# Patient Record
Sex: MALE | Race: WHITE | NOT HISPANIC OR LATINO | URBAN - METROPOLITAN AREA
[De-identification: names, ages, dates, MRNs, and addresses within clinical notes are randomized per-mention and may not be internally consistent; named-entity substitution may affect disease eponyms.]

---

## 2021-08-18 ENCOUNTER — HOSPITAL ENCOUNTER (OUTPATIENT)
Facility: CLINIC | Age: 31
Discharge: HOME | End: 2021-08-18
Attending: INTERNAL MEDICINE
Payer: COMMERCIAL

## 2021-08-18 VITALS
OXYGEN SATURATION: 98 % | DIASTOLIC BLOOD PRESSURE: 60 MMHG | SYSTOLIC BLOOD PRESSURE: 130 MMHG | TEMPERATURE: 98 F | HEART RATE: 82 BPM

## 2021-08-18 DIAGNOSIS — L03.311 CELLULITIS, ABDOMINAL WALL: Primary | ICD-10-CM

## 2021-08-18 PROCEDURE — 87070 CULTURE OTHR SPECIMN AEROBIC: CPT | Performed by: INTERNAL MEDICINE

## 2021-08-18 PROCEDURE — 99203 OFFICE O/P NEW LOW 30 MIN: CPT | Performed by: INTERNAL MEDICINE

## 2021-08-18 PROCEDURE — S9083 URGENT CARE CENTER GLOBAL: HCPCS | Performed by: INTERNAL MEDICINE

## 2021-08-18 PROCEDURE — 87077 CULTURE AEROBIC IDENTIFY: CPT | Mod: 59 | Performed by: INTERNAL MEDICINE

## 2021-08-18 RX ORDER — CEPHALEXIN 500 MG/1
500 CAPSULE ORAL 3 TIMES DAILY
Qty: 24 CAPSULE | Refills: 0 | Status: SHIPPED | OUTPATIENT
Start: 2021-08-18 | End: 2021-08-26

## 2021-08-18 ASSESSMENT — ENCOUNTER SYMPTOMS
HEADACHES: 0
BACK PAIN: 0
SHORTNESS OF BREATH: 0
FEVER: 0
VOMITING: 0
NAUSEA: 0

## 2021-08-18 NOTE — DISCHARGE INSTRUCTIONS
In the office a wound culture was obtained from inside the umbilicus.  Examination is consistent with an infection given the redness and tenderness.  I am starting you back on antibiotics.  Please take as directed.  These were sent electronically to [facility].    Motrin or Advil can be used for pain as needed and directed.  Warm compresses in 15 to 20-minute intervals several times a day may be helpful as well.    I called and spoke with the surgery office at [facility].  We have you set up to see Dr. Gordillo tomorrow afternoon at 2 PM.  Please have a surgical mask, your ID and insurance card.  They are located in [place] [id number], [id number]  They will have access to your epic chart that was done here in the office today.

## 2021-08-18 NOTE — ED PROVIDER NOTES
History  Chief Complaint   Patient presents with   • Belly Button Discharge     Pus and bloody drainage from umbilicus  Happened a month ago; treated with keflex  Also cleaned with peroxide    Had abdominal US and recommended a follow-up  (limited due to gas)    NKDA      History provided by:  Patient   used: No    Rash  Quality: painful and weeping    Onset quality:  Sudden  Duration:  2 days  Chronicity:  New  Worsened by:  Contact  Associated symptoms: no fever, no headaches, no nausea, no shortness of breath and not vomiting    Associated symptoms comment:  Chronic HA's      No past medical history on file.    No past surgical history on file.    No family history on file.    Social History     Tobacco Use   • Smoking status: Not on file   Substance Use Topics   • Alcohol use: Not on file   • Drug use: Not on file       Review of Systems   Constitutional: Negative for fever.   Respiratory: Negative for shortness of breath.    Gastrointestinal: Negative for nausea and vomiting.   Musculoskeletal: Negative for back pain.   Skin: Positive for rash.   Neurological: Negative for headaches.       Physical Exam  ED Triage Vitals [08/18/21 1437]   Temp Heart Rate Resp BP SpO2   36.7 °C (98 °F) 82 -- 130/60 98 %      Temp Source Heart Rate Source Patient Position BP Location FiO2 (%) (Set)   Oral -- -- Left upper arm --       Physical Exam  Vitals reviewed.   Constitutional:       General: He is not in acute distress.     Appearance: He is not ill-appearing, toxic-appearing or diaphoretic.   HENT:      Mouth/Throat:      Mouth: Mucous membranes are moist.      Pharynx: No oropharyngeal exudate or posterior oropharyngeal erythema.   Cardiovascular:      Rate and Rhythm: Normal rate and regular rhythm.      Heart sounds: No murmur heard.     Pulmonary:      Effort: Pulmonary effort is normal. No respiratory distress.      Breath sounds: No wheezing or rales.   Abdominal:      General: There is no  distension.      Palpations: Abdomen is soft.      Hernia: No hernia is present.   Musculoskeletal:      Comments: There is erythema circumferentially around the umbilicus with some tenderness but no fluctuance.  There is dried crusted drainage over the umbilicus with tenderness associated around and inside the umbilicus.  No fluctuance appreciated.  Does not feel indurated.   Skin:     General: Skin is warm and dry.      Findings: Erythema present.   Neurological:      General: No focal deficit present.      Mental Status: He is alert.   Psychiatric:         Mood and Affect: Mood normal.         Behavior: Behavior normal.           Procedures  Procedures    UC Course  Clinical Impressions as of Aug 18 1455   Cellulitis, abdominal wall       MDM  Number of Diagnoses or Management Options  Cellulitis, abdominal wall  Diagnosis management comments: No real active drainage during my examination.  I tried to express some pustular material.  Did do a swab inside the umbilicus.  Restarting Keflex which seemed to resolve the problem a month ago.  I am going to have him follow-up with surgery tomorrow at [place].  I have confirmed a 2 PM appointment with Dr. Gordillo.  Please see disposition for full care plan.                 Esa Hernandez,   08/18/21 9134

## 2021-08-19 ENCOUNTER — OFFICE VISIT (OUTPATIENT)
Dept: SURGERY | Facility: CLINIC | Age: 31
End: 2021-08-19
Payer: COMMERCIAL

## 2021-08-19 VITALS — WEIGHT: 290 LBS | HEIGHT: 77 IN | BODY MASS INDEX: 34.24 KG/M2

## 2021-08-19 DIAGNOSIS — L02.216 ABSCESS, UMBILICAL: Primary | ICD-10-CM

## 2021-08-19 PROCEDURE — 3008F BODY MASS INDEX DOCD: CPT | Performed by: SURGERY

## 2021-08-19 PROCEDURE — 99204 OFFICE O/P NEW MOD 45 MIN: CPT | Performed by: SURGERY

## 2021-08-20 LAB
GRAM STN SPEC: ABNORMAL
GRAM STN SPEC: ABNORMAL
MICROORGANISM SPEC CULT: ABNORMAL

## 2021-08-23 ASSESSMENT — ENCOUNTER SYMPTOMS
COLOR CHANGE: 0
PHOTOPHOBIA: 1
PSYCHIATRIC NEGATIVE: 1
COUGH: 0
DIARRHEA: 0
ABDOMINAL PAIN: 1
CHILLS: 0
WOUND: 1
MUSCULOSKELETAL NEGATIVE: 1
BLOOD IN STOOL: 0
ENDOCRINE NEGATIVE: 1
SORE THROAT: 0
APPETITE CHANGE: 0
ACTIVITY CHANGE: 0
NEUROLOGICAL NEGATIVE: 1
CONSTIPATION: 0
HEMATOLOGIC/LYMPHATIC NEGATIVE: 1
ALLERGIC/IMMUNOLOGIC NEGATIVE: 1
FEVER: 0
ABDOMINAL DISTENTION: 0
SHORTNESS OF BREATH: 1

## 2021-08-23 NOTE — PROGRESS NOTES
[facility]    Dr. Marley Gordillo   General Surgery Consult       Patient: Esa Luna  1990    Visit Date: 8/19/2021  Encounter Provider: Marley Gordillo  Referring Provider: Enrrique Campos DO    Reason for Visit:  Consult (Umbilical Drainage)      Esa Luna is a 31 y.o. male who was seen for a consultation at the request of referring physician for recurrent umbilical drainage. The patient had a previous episode of umbilical drainage last month. Resolved with antibiotics. The patient now presents with recurrent drainage and pain from his umbilicus for the past few days. States it is blood stained drainage. He is also cleaning his umbilicus with hydrogen peroxide multiple times a day and probing the wound. He states his abdominal wall feels hot and he has pain when touching his umbilicus.  Denies fever, chills. No changes in bowel habits. No bulging of the umbilicus. I reviewed the previous PCP notes from [facility].   History reviewed. No pertinent past medical history.    History reviewed. No pertinent surgical history.    Social History     Tobacco Use   • Smoking status: Never Smoker   • Smokeless tobacco: Current User   Vaping Use   • Vaping Use: Every day   • Substances: Nicotine   • Devices: Disposable   Substance Use Topics   • Alcohol use: Not Currently   • Drug use: Never       Family History   Problem Relation Age of Onset   • Liver cancer Biological Father        Patient has no known allergies.    Current Medications:  Current Outpatient Medications   Medication Instructions   • cephalexin (KEFLEX) 500 mg, oral, 3 times daily       Review of Systems  Review of Systems   Constitutional: Negative for activity change, appetite change, chills and fever.   HENT: Negative for sneezing, sore throat and tinnitus.    Eyes: Positive for photophobia. Negative for visual disturbance.   Respiratory: Positive for shortness of breath. Negative for cough.    Cardiovascular: Negative  for chest pain and leg swelling.   Gastrointestinal: Positive for abdominal pain. Negative for abdominal distention, blood in stool, constipation and diarrhea.   Endocrine: Negative.    Genitourinary: Negative.    Musculoskeletal: Negative.    Skin: Positive for wound. Negative for color change.   Allergic/Immunologic: Negative.    Neurological: Negative.    Hematological: Negative.    Psychiatric/Behavioral: Negative.        Physical Exam  Physical Exam  Constitutional:       Appearance: Normal appearance. He is obese.   HENT:      Head: Normocephalic and atraumatic.      Right Ear: External ear normal.      Left Ear: External ear normal.      Mouth/Throat:      Mouth: Mucous membranes are moist.   Eyes:      General: No scleral icterus.     Conjunctiva/sclera: Conjunctivae normal.   Cardiovascular:      Rate and Rhythm: Normal rate and regular rhythm.      Pulses: Normal pulses.      Heart sounds: Normal heart sounds.   Pulmonary:      Effort: Pulmonary effort is normal.      Breath sounds: Normal breath sounds.   Abdominal:      General: There is no distension.      Palpations: Abdomen is soft. There is no mass.      Hernia: No hernia is present.      Comments: Umbilical base with small area of erythema, no hernia, small amount of serosanguinous drainage, no purulent drainage , crusty discharge removed from the base.   No periumbilical erythema, no skin induration.    Musculoskeletal:      Cervical back: Normal range of motion and neck supple.   Neurological:      Mental Status: He is alert.           Assessment      Diagnosis Plan   1. Abscess, umbilical  CT ABDOMEN PELVIS WITH IV CONTRAST    BUN    Creatinine, serum             Plan:   CT of the abdomen and pelvis to evaluate for hernia vs. Urachal cyst vs abdominal wall abscess  Finish PO antibiotics  Discontinue hydrogen peroxide use, patient instructed to clean with mild soap and water or saline and keep umbilicus dry   Follow up after CT       Marley Gordillo  MD

## 2021-08-24 ENCOUNTER — TELEPHONE (OUTPATIENT)
Dept: SURGERY | Facility: CLINIC | Age: 31
End: 2021-08-24

## 2021-08-25 ENCOUNTER — APPOINTMENT (OUTPATIENT)
Dept: LAB | Age: 31
End: 2021-08-25
Attending: SURGERY
Payer: COMMERCIAL

## 2021-08-25 DIAGNOSIS — L02.216 CUTANEOUS ABSCESS OF UMBILICUS: ICD-10-CM

## 2021-08-25 PROCEDURE — 30099997 SPECIMEN PROCESSING

## 2021-08-27 ENCOUNTER — HOSPITAL ENCOUNTER (OUTPATIENT)
Dept: RADIOLOGY | Facility: HOSPITAL | Age: 31
Discharge: HOME | End: 2021-08-27
Attending: SURGERY
Payer: COMMERCIAL

## 2021-08-27 DIAGNOSIS — L02.216 ABSCESS, UMBILICAL: ICD-10-CM

## 2021-08-27 LAB
BUN SERPL-MCNC: 18 MG/DL (ref 7–25)
CREAT SERPL-MCNC: 0.88 MG/DL (ref 0.6–1.35)
QUEST EGFR AFRICAN AMERICAN: 134 ML/MIN/1.73M2
QUEST EGFR NON-AFR. AMERICAN: 115 ML/MIN/1.73M2

## 2021-08-27 PROCEDURE — G1004 CDSM NDSC: HCPCS

## 2021-08-27 PROCEDURE — 63600105 HC IODINE BASED CONTRAST: Performed by: SURGERY

## 2021-08-27 RX ADMIN — IOHEXOL 112 ML: 300 INJECTION, SOLUTION INTRAVENOUS at 15:36

## 2021-08-31 ENCOUNTER — OFFICE VISIT (OUTPATIENT)
Dept: SURGERY | Facility: CLINIC | Age: 31
End: 2021-08-31
Payer: COMMERCIAL

## 2021-08-31 VITALS — BODY MASS INDEX: 34.24 KG/M2 | HEIGHT: 77 IN | WEIGHT: 290 LBS

## 2021-08-31 DIAGNOSIS — Q64.4 URACHAL REMNANT: Primary | ICD-10-CM

## 2021-08-31 PROCEDURE — 3008F BODY MASS INDEX DOCD: CPT | Performed by: SURGERY

## 2021-08-31 PROCEDURE — 99213 OFFICE O/P EST LOW 20 MIN: CPT | Performed by: SURGERY

## 2021-08-31 NOTE — PROGRESS NOTES
[facility]    Dr. Marley Gordillo   Office Visit       Patient: Esa Luna  1990    Visit Date: 08/31/2021  Encounter Provider: Marley Gordillo  Referring Provider: Enrrique Campos DO    Reason for Visit:  Follow-up      Esa Luna is a 31 y.o. male who was seen for a follow up of umbilical wound drainage and CT. Wound drainage resolved. Finished antibiotics. No periumbilical pain. Some pain in LUQ after CT, resolving.  No fever, no chills.   History reviewed. No pertinent past medical history.    History reviewed. No pertinent surgical history.    Social History     Social History Narrative   • Not on file       Family History   Problem Relation Age of Onset   • Liver cancer Biological Father        Patient has no known allergies.    Current Medications:  Current Outpatient Medications   Medication Instructions   • naproxen sodium (ALEVE ORAL) oral, As needed       Review of Systems  Review of Systems   Constitutional: Negative for chills and fever.   HENT: Negative.    Eyes: Negative.    Respiratory: Negative.    Cardiovascular: Negative.    Gastrointestinal: Positive for abdominal pain. Negative for abdominal distention, diarrhea and nausea.   Endocrine: Negative.    Genitourinary: Negative.  Negative for dysuria, flank pain and hematuria.   Musculoskeletal: Negative.    Skin: Negative for color change and wound.   Allergic/Immunologic: Negative.    Neurological: Negative.    Hematological: Negative.    Psychiatric/Behavioral: Negative.        Physical Exam  Physical Exam  Constitutional:       Appearance: Normal appearance. He is obese.   HENT:      Head: Normocephalic and atraumatic.      Nose: Nose normal.      Mouth/Throat:      Mouth: Mucous membranes are moist.   Eyes:      General: Scleral icterus present.      Pupils: Pupils are equal, round, and reactive to light.   Cardiovascular:      Rate and Rhythm: Normal rate.      Pulses: Normal pulses.   Pulmonary:       Effort: Pulmonary effort is normal. No respiratory distress.   Abdominal:      General: Abdomen is flat. Bowel sounds are normal. There is no distension.      Palpations: Abdomen is soft. There is no mass.      Tenderness: There is no abdominal tenderness.      Hernia: No hernia is present.   Musculoskeletal:         General: No swelling or tenderness. Normal range of motion.      Cervical back: Normal range of motion.   Skin:     General: Skin is warm.      Capillary Refill: Capillary refill takes less than 2 seconds.      Coloration: Skin is not jaundiced.      Findings: No erythema or rash.   Neurological:      General: No focal deficit present.      Mental Status: He is oriented to person, place, and time.   Psychiatric:         Mood and Affect: Mood normal.         Behavior: Behavior normal.           Labs  No results found for: WBC, HGB, HCT, MCV, PLT    Lab Results   Component Value Date    BUN 18 2021    CREATININE 0.88 2021       No results found for: ALT, AST, GGT, ALKPHOS, BILITOT      Imaging  CT ABDOMEN PELVIS WITH IV CONTRAST  Narrative: CLINICAL HISTORY:    Abdominal infection suspected  Urachal abnormality suspected (post-)    TECHNIQUE: Helical acquisition of the abdomen and pelvis after administration of  intravenous   112 cc of Omnipaque 350.  Oral contrast also administered.  Delayed images through the abdomen also performed.  Coronal and sagittal reformats also performed.    CT DOSE:  One or more dose reduction techniques (e.g. automated exposure  control, adjustment of the mA and/or kV according to patient size, use of  iterative reconstruction technique) utilized for this examination    COMPARISON: None    FINDINGS:    -Lower chest: Within normal limits.    -Liver: Liver is of relatively hypoattenuating.  No liver lesion appreciated.  -Gallbladder: No calcified gallstones.  -Bile Ducts: No significant biliary ductal dilatation.  -Spleen:  No splenomegaly or focal  lesion.  -Pancreas: No mass, ductal dilatation, or inflammatory changes.  -Kidneys: No solid mass or hydronephrosis, or any definite calculi.  -Adrenals:  No nodules.  -Lymph Nodes: No adenopathy.  -Vascular: No aortic aneurysm or any significant vascular abnormalities.  -Abdominal Wall: No hernia, fluid collection, or any significant findings.  -Bones: No acute findings    BOWEL/MESENTERY: Bowel normal in caliber.  No inflammatory changes.  Appendix is normal.    PELVIS:  -Bladder: Urinary bladder decompressed.  No urinary calculus.  There is some  relative thickening of the urachal remnants extending from the apex of the  urinary bladder (image 77 of the sagittal reformats).  Remainder of the urachal  remnant within normal limits.  -Reproductive Organs:No evidence of a mass.  -Lymph Nodes: Within normal limits.  -Miscellaneous: No free fluid  Impression: IMPRESSION:    1.  No acute abnormality of the abdomen or pelvis.    2.  There is relative thickening of the urachal remnant at the dome of the  urinary bladder.  Possible diverticulum.    3.  Hepatic steatosis.  CT images reviewed with urachal remnant at dome of bladder    Assessment     No diagnosis found.          Plan:   I discussed and reviewed the images of the CT abdomen and pelvis with the patient.   Discussed with patient urachal remnant near dome of bladder and recommended urology referral .  Umbilical drainage likely secondary to urachal sinus  Urology referral for urachal remnant at dome of bladder  All patient questions answered  If recurrent drainage, patient advised to RTO for antibiotics and re-evaluation.       Marley Gordillo MD  08/31/2021

## 2021-09-01 ASSESSMENT — ENCOUNTER SYMPTOMS
COLOR CHANGE: 0
NAUSEA: 0
RESPIRATORY NEGATIVE: 1
ALLERGIC/IMMUNOLOGIC NEGATIVE: 1
CARDIOVASCULAR NEGATIVE: 1
ENDOCRINE NEGATIVE: 1
ABDOMINAL DISTENTION: 0
PSYCHIATRIC NEGATIVE: 1
WOUND: 0
NEUROLOGICAL NEGATIVE: 1
CHILLS: 0
HEMATOLOGIC/LYMPHATIC NEGATIVE: 1
MUSCULOSKELETAL NEGATIVE: 1
FEVER: 0
HEMATURIA: 0
DYSURIA: 0
FLANK PAIN: 0
ABDOMINAL PAIN: 1
EYES NEGATIVE: 1
DIARRHEA: 0

## 2021-11-02 ASSESSMENT — PAIN SCALES - GENERAL: PAINLEVEL: 0-NO PAIN

## 2021-11-05 ENCOUNTER — OFFICE VISIT (OUTPATIENT)
Dept: PREADMISSION TESTING | Facility: HOSPITAL | Age: 31
End: 2021-11-05
Attending: UROLOGY
Payer: COMMERCIAL

## 2021-11-05 ENCOUNTER — APPOINTMENT (OUTPATIENT)
Dept: LAB | Facility: HOSPITAL | Age: 31
DRG: 989 | End: 2021-11-05
Attending: UROLOGY
Payer: COMMERCIAL

## 2021-11-05 ENCOUNTER — TRANSCRIBE ORDERS (OUTPATIENT)
Dept: PREADMISSION TESTING | Facility: HOSPITAL | Age: 31
End: 2021-11-05

## 2021-11-05 ENCOUNTER — HOSPITAL ENCOUNTER (OUTPATIENT)
Dept: CARDIOLOGY | Facility: HOSPITAL | Age: 31
Discharge: HOME | DRG: 989 | End: 2021-11-05
Attending: UROLOGY
Payer: COMMERCIAL

## 2021-11-05 VITALS
DIASTOLIC BLOOD PRESSURE: 71 MMHG | BODY MASS INDEX: 35.02 KG/M2 | HEART RATE: 80 BPM | HEIGHT: 77 IN | RESPIRATION RATE: 18 BRPM | OXYGEN SATURATION: 100 % | SYSTOLIC BLOOD PRESSURE: 128 MMHG | WEIGHT: 296.6 LBS | TEMPERATURE: 98.5 F

## 2021-11-05 DIAGNOSIS — E66.9 OBESITY WITHOUT SERIOUS COMORBIDITY, UNSPECIFIED CLASSIFICATION, UNSPECIFIED OBESITY TYPE: ICD-10-CM

## 2021-11-05 DIAGNOSIS — S31.105A OPEN WOUND OF UMBILICAL REGION, INITIAL ENCOUNTER: ICD-10-CM

## 2021-11-05 DIAGNOSIS — Q64.4 MALFORMATION OF URACHUS: ICD-10-CM

## 2021-11-05 DIAGNOSIS — Z01.818 PREOP TESTING: Primary | ICD-10-CM

## 2021-11-05 DIAGNOSIS — Q64.4 MALFORMATION OF URACHUS: Primary | ICD-10-CM

## 2021-11-05 DIAGNOSIS — Z72.0 TOBACCO USE: ICD-10-CM

## 2021-11-05 LAB
ABO + RH BLD: NORMAL
ANION GAP SERPL CALC-SCNC: 9 MEQ/L (ref 3–15)
APTT PPP: 31 SEC (ref 23–35)
ATRIAL RATE: 74
BLD GP AB SCN SERPL QL: NEGATIVE
BUN SERPL-MCNC: 17 MG/DL (ref 8–20)
CALCIUM SERPL-MCNC: 9.6 MG/DL (ref 8.9–10.3)
CHLORIDE SERPL-SCNC: 102 MEQ/L (ref 98–109)
CO2 SERPL-SCNC: 26 MEQ/L (ref 22–32)
CREAT SERPL-MCNC: 1.1 MG/DL (ref 0.8–1.3)
D AG BLD QL: POSITIVE
ERYTHROCYTE [DISTWIDTH] IN BLOOD BY AUTOMATED COUNT: 11.9 % (ref 11.6–14.4)
GFR SERPL CREATININE-BSD FRML MDRD: >60 ML/MIN/1.73M*2
GLUCOSE SERPL-MCNC: 87 MG/DL (ref 70–99)
HCT VFR BLDCO AUTO: 43.2 % (ref 40.1–51)
HGB BLD-MCNC: 14.8 G/DL (ref 13.7–17.5)
INR PPP: 1.1
LABORATORY COMMENT REPORT: NORMAL
MCH RBC QN AUTO: 31.5 PG (ref 28–33.2)
MCHC RBC AUTO-ENTMCNC: 34.3 G/DL (ref 32.2–36.5)
MCV RBC AUTO: 91.9 FL (ref 83–98)
P AXIS: 67
PDW BLD AUTO: 12.1 FL (ref 9.4–12.4)
PLATELET # BLD AUTO: 271 K/UL (ref 150–350)
POTASSIUM SERPL-SCNC: 3.8 MEQ/L (ref 3.6–5.1)
PR INTERVAL: 164
PROTHROMBIN TIME: 13.3 SEC (ref 12.2–14.5)
QRS DURATION: 102
QT INTERVAL: 376
QTC CALCULATION(BAZETT): 417
R AXIS: 54
RBC # BLD AUTO: 4.7 M/UL (ref 4.5–5.8)
SODIUM SERPL-SCNC: 137 MEQ/L (ref 136–144)
SPECIMEN EXP DATE BLD: NORMAL
T WAVE AXIS: 37
VENTRICULAR RATE: 74
WBC # BLD AUTO: 10.12 K/UL (ref 3.8–10.5)

## 2021-11-05 PROCEDURE — 85730 THROMBOPLASTIN TIME PARTIAL: CPT

## 2021-11-05 PROCEDURE — 80048 BASIC METABOLIC PNL TOTAL CA: CPT | Mod: 59

## 2021-11-05 PROCEDURE — 36415 COLL VENOUS BLD VENIPUNCTURE: CPT

## 2021-11-05 PROCEDURE — 86900 BLOOD TYPING SEROLOGIC ABO: CPT

## 2021-11-05 PROCEDURE — 87086 URINE CULTURE/COLONY COUNT: CPT

## 2021-11-05 PROCEDURE — 85610 PROTHROMBIN TIME: CPT

## 2021-11-05 PROCEDURE — 93010 ELECTROCARDIOGRAM REPORT: CPT | Performed by: INTERNAL MEDICINE

## 2021-11-05 PROCEDURE — 3008F BODY MASS INDEX DOCD: CPT | Performed by: HOSPITALIST

## 2021-11-05 PROCEDURE — 85027 COMPLETE CBC AUTOMATED: CPT

## 2021-11-05 PROCEDURE — 99214 OFFICE O/P EST MOD 30 MIN: CPT | Performed by: HOSPITALIST

## 2021-11-05 PROCEDURE — 93005 ELECTROCARDIOGRAM TRACING: CPT

## 2021-11-05 ASSESSMENT — PAIN SCALES - GENERAL: PAINLEVEL: 0-NO PAIN

## 2021-11-05 NOTE — ASSESSMENT & PLAN NOTE
Tobacco use and Vaping.   The patient was educated on the risks associated with ongoing tobacco use, including the increased risk of perioperative morbidity, in addition to the increased risk of MI, pulmonary disease, malignancy, and premature death.  He was counseled on smoking cessation.

## 2021-11-05 NOTE — CONSULTS
[facility] Medicine [facility] -  Pre-Operative Consultation       Patient Name: Esa Luna  Referring Surgeon: Dr. River    Reason for Referral: Pre-Operative Evaluation  Surgical Procedure: Robotic Lap Urachal resection, possible partial cystectomy  Operative Date: 11/19/21    PCP: Enrrique Campos DO        HISTORY OF PRESENT ILLNESS      Esa Luna is a 31 y.o. male presenting today to the [facility] at [place] for pre-operative evaluation prior to planned surgery.    Mr. Luna had several episodes of umbilical drainage and was treated with antibiotics, however the symptoms recurred. 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 He was referred to Dr. River, urology, for surgical management of this.  He denies having current drainage or any signs of infection at this time.    Overall, the patient has been feeling in good health. He denies current or recent CP, SOB, palpitations, syncope, fever, cough, abd pain, vomiting, diarrhea, BRBPR, melena, hematemesis, dysuria, hematuria, headache, vision change, vertigo, numbness/tingling/focal weakness, light headedness, or additional complaint.       Functionally, the patient is able to ascend a flight of stairs with no dyspnea or chest pain. He can walk at least a city block. He works in heating and air conditioning.  He denies ever having any anginal symptoms.  Functional capacity is  > 4 METS.    The patient denies, on specific questioning, the following:  No history of MI, arrhythmia, valvular heart disease or CHF.  No history of GARY.  No history of DVT/PE.  No history of COPD.  No history of CVA or TIA.  No history of DM or insulin use.   No history of CKD.   No history of liver disease or cirrhosis.  No history of bleeding disorder.  No history of difficult airway/difficult intubation.  No history of Malignant  "hyperthermia.  No history of adverse reaction to anesthesia in the past.      PAST MEDICAL AND SURGICAL HISTORY      Past Medical History:   Diagnosis Date   • Arthritis     neck   • Fatty liver disease, nonalcoholic    • Wears contact lenses    • Wears glasses        Past Surgical History:   Procedure Laterality Date   • DENTAL SURGERY      3rd molars       MEDICATIONS        Current Outpatient Medications:   •  naproxen sodium (ALEVE) 220 mg capsule, Take 440 mg by mouth as needed.  , Disp: , Rfl:     ALLERGIES      Patient has no known allergies.    FAMILY HISTORY      family history includes Liver cancer in his biological father.    SOCIAL HISTORY      Social History     Tobacco Use   • Smoking status: Current Every Day Smoker     Types: Electronic Cigarette   • Smokeless tobacco: Former User     Types: Snuff   • Tobacco comment: 1 pod 2- 3 days   Vaping Use   • Vaping Use: Every day   • Substances: Nicotine   • Devices: Disposable   Substance Use Topics   • Alcohol use: Not Currently   • Drug use: Not Currently     Comment: none 4 yrs opiods       REVIEW OF SYSTEMS      All other systems reviewed and negative except as noted in HPI    PHYSICAL EXAMINATION      Visit Vitals  /71   Pulse 80   Temp 36.9 °C (98.5 °F) (Oral)   Resp 18   Ht 1.956 m (6' 5\")   Wt 135 kg (296 lb 9.6 oz)   SpO2 100%   BMI 35.17 kg/m²     Body mass index is 35.17 kg/m².    Physical Exam  General: obese, nontoxic appearing, no acute distress  HEENT: Moist membranes, PERRL, anicteric sclera   Neck: supple, no JVD  Cardiac: RRR, +S1/S2, no murmur, no rub   Lungs: clear bilaterally, no wheezing/rales/rhonchi  Abdomen: soft, NT/ND, +BS, no rebound/guarding   Extremities: no edema, distal perfusion intact  MSK:  no joint effusion or erythema  Neuro: AAOx3, nonfocal.   Skin: no rash. Clean, dry, intact.   Psych: cooperative    LABS / EKG        Labs  Today's labs reviewed.     Lab Results   Component Value Date     11/05/2021    K " 3.8 11/05/2021     11/05/2021    BUN 17 11/05/2021    CREATININE 1.1 11/05/2021    WBC 10.12 11/05/2021    HGB 14.8 11/05/2021    HCT 43.2 11/05/2021     11/05/2021    INR 1.1 11/05/2021         ECG   Reviewed. NSR 74 bpm. Normal.    ASSESSMENT AND PLAN         Open wound of umbilical region  Robotic Lap Urachal resection, possible partial cystectomy planned for 11/19/21.  See below for statement in regards to perioperative risk.   The patient will stop NSAIDS preoperatively.  Recommend DVT prophylaxis at the discretion of the surgeon.   Incentive spirometry and early ambulation post op.  Perioperative antibiotics defer to surgery.  Post op bowel regimen.  If present, recommend removal of grady catheter as early as possible to prevent infection.  Monitor CBC, BMP, and volume status in the perioperative period.      Tobacco use  Tobacco use and Vaping.   The patient was educated on the risks associated with ongoing tobacco use, including the increased risk of perioperative morbidity, in addition to the increased risk of MI, pulmonary disease, malignancy, and premature death.  He was counseled on smoking cessation.       Obesity  Recommend lifestyle modification.  Fatty liver noted on imaging.  Ongoing follow up with PCP       In regards to perioperative cardiac risk:  The patient denies any history of ischemic heart disease, denies any history of CHF, denies any history of CVA, is not on pre-operative treatment with insulin, and does not have a pre-operative creatinine > 2 mg/dL.   The Revised Cardiac Risk Index (RCRI) = 0 for this patient which indicates a 0.4% risk of major adverse cardiac event in the perioperative period for this intermediate risk procedure.       Further comments:  STOP-BANG screening for obstructive sleep apnea = 3, which indicates the patient is moderate risk for having underlying GARY.   Sleep apnea places the patient at relatively increased risk (compared to a population without  this diagnosis) of oxygen desaturation, cardiac events, acute respiratory failure, or an ICU transfer.   In the post op period: recommend use of our sleep apnea protocol in the PACU, pulse ox monitoring, consider extubate to BIPAP.  Reduce narcotic medication dosage as much as possible.   The patient should have follow up formal polysomnography to receive a definitive diagnosis.          Please do not hesitate to contact [facility] [place], [place] during the upcoming hospitalization with any questions or concerns.     Vladimir Huynh,   11/8/2021

## 2021-11-05 NOTE — H&P (VIEW-ONLY)
[facility] Medicine Service -  Pre-Operative Consultation       Patient Name: Esa Luna  Referring Surgeon: Dr. River    Reason for Referral: Pre-Operative Evaluation  Surgical Procedure: Robotic Lap Urachal resection, possible partial cystectomy  Operative Date: 11/19/21    PCP: Enrrique Campos DO        HISTORY OF PRESENT ILLNESS      Esa Luna is a 31 y.o. male presenting today to the [facility] at [place] for pre-operative evaluation prior to planned surgery.    Mr. Luna had several episodes of umbilical drainage and was treated with antibiotics, however the symptoms recurred. 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 He was referred to Dr. River, urology, for surgical management of this.  He denies having current drainage or any signs of infection at this time.    Overall, the patient has been feeling in good health. He denies current or recent CP, SOB, palpitations, syncope, fever, cough, abd pain, vomiting, diarrhea, BRBPR, melena, hematemesis, dysuria, hematuria, headache, vision change, vertigo, numbness/tingling/focal weakness, light headedness, or additional complaint.       Functionally, the patient is able to ascend a flight of stairs with no dyspnea or chest pain. He can walk at least a city block. He works in heating and air conditioning.  He denies ever having any anginal symptoms.  Functional capacity is  > 4 METS.    The patient denies, on specific questioning, the following:  No history of MI, arrhythmia, valvular heart disease or CHF.  No history of GARY.  No history of DVT/PE.  No history of COPD.  No history of CVA or TIA.  No history of DM or insulin use.   No history of CKD.   No history of liver disease or cirrhosis.  No history of bleeding disorder.  No history of difficult airway/difficult intubation.  No history of Malignant  "hyperthermia.  No history of adverse reaction to anesthesia in the past.      PAST MEDICAL AND SURGICAL HISTORY      Past Medical History:   Diagnosis Date   • Arthritis     neck   • Fatty liver disease, nonalcoholic    • Wears contact lenses    • Wears glasses        Past Surgical History:   Procedure Laterality Date   • DENTAL SURGERY      3rd molars       MEDICATIONS        Current Outpatient Medications:   •  naproxen sodium (ALEVE) 220 mg capsule, Take 440 mg by mouth as needed.  , Disp: , Rfl:     ALLERGIES      Patient has no known allergies.    FAMILY HISTORY      family history includes Liver cancer in his biological father.    SOCIAL HISTORY      Social History     Tobacco Use   • Smoking status: Current Every Day Smoker     Types: Electronic Cigarette   • Smokeless tobacco: Former User     Types: Snuff   • Tobacco comment: 1 pod 2- 3 days   Vaping Use   • Vaping Use: Every day   • Substances: Nicotine   • Devices: Disposable   Substance Use Topics   • Alcohol use: Not Currently   • Drug use: Not Currently     Comment: none 4 yrs opiods       REVIEW OF SYSTEMS      All other systems reviewed and negative except as noted in HPI    PHYSICAL EXAMINATION      Visit Vitals  /71   Pulse 80   Temp 36.9 °C (98.5 °F) (Oral)   Resp 18   Ht 1.956 m (6' 5\")   Wt 135 kg (296 lb 9.6 oz)   SpO2 100%   BMI 35.17 kg/m²     Body mass index is 35.17 kg/m².    Physical Exam  General: obese, nontoxic appearing, no acute distress  HEENT: Moist membranes, PERRL, anicteric sclera   Neck: supple, no JVD  Cardiac: RRR, +S1/S2, no murmur, no rub   Lungs: clear bilaterally, no wheezing/rales/rhonchi  Abdomen: soft, NT/ND, +BS, no rebound/guarding   Extremities: no edema, distal perfusion intact  MSK:  no joint effusion or erythema  Neuro: AAOx3, nonfocal.   Skin: no rash. Clean, dry, intact.   Psych: cooperative    LABS / EKG        Labs  Today's labs reviewed.     Lab Results   Component Value Date     11/05/2021    K " 3.8 11/05/2021     11/05/2021    BUN 17 11/05/2021    CREATININE 1.1 11/05/2021    WBC 10.12 11/05/2021    HGB 14.8 11/05/2021    HCT 43.2 11/05/2021     11/05/2021    INR 1.1 11/05/2021         ECG   Reviewed. NSR 74 bpm. Normal.    ASSESSMENT AND PLAN         Open wound of umbilical region  Robotic Lap Urachal resection, possible partial cystectomy planned for 11/19/21.  See below for statement in regards to perioperative risk.   The patient will stop NSAIDS preoperatively.  Recommend DVT prophylaxis at the discretion of the surgeon.   Incentive spirometry and early ambulation post op.  Perioperative antibiotics defer to surgery.  Post op bowel regimen.  If present, recommend removal of grady catheter as early as possible to prevent infection.  Monitor CBC, BMP, and volume status in the perioperative period.      Tobacco use  Tobacco use and Vaping.   The patient was educated on the risks associated with ongoing tobacco use, including the increased risk of perioperative morbidity, in addition to the increased risk of MI, pulmonary disease, malignancy, and premature death.  He was counseled on smoking cessation.       Obesity  Recommend lifestyle modification.  Fatty liver noted on imaging.  Ongoing follow up with PCP       In regards to perioperative cardiac risk:  The patient denies any history of ischemic heart disease, denies any history of CHF, denies any history of CVA, is not on pre-operative treatment with insulin, and does not have a pre-operative creatinine > 2 mg/dL.   The Revised Cardiac Risk Index (RCRI) = 0 for this patient which indicates a 0.4% risk of major adverse cardiac event in the perioperative period for this intermediate risk procedure.       Further comments:  STOP-BANG screening for obstructive sleep apnea = 3, which indicates the patient is moderate risk for having underlying GARY.   Sleep apnea places the patient at relatively increased risk (compared to a population without  this diagnosis) of oxygen desaturation, cardiac events, acute respiratory failure, or an ICU transfer.   In the post op period: recommend use of our sleep apnea protocol in the PACU, pulse ox monitoring, consider extubate to BIPAP.  Reduce narcotic medication dosage as much as possible.   The patient should have follow up formal polysomnography to receive a definitive diagnosis.          Please do not hesitate to contact [facility] during the upcoming hospitalization with any questions or concerns.     Vladimir Huynh,   11/8/2021

## 2021-11-05 NOTE — ASSESSMENT & PLAN NOTE
Robotic Lap Urachal resection, possible partial cystectomy planned for 11/19/21.  See below for statement in regards to perioperative risk.   The patient will stop NSAIDS preoperatively.  Recommend DVT prophylaxis at the discretion of the surgeon.   Incentive spirometry and early ambulation post op.  Perioperative antibiotics defer to surgery.  Post op bowel regimen.  If present, recommend removal of grady catheter as early as possible to prevent infection.  Monitor CBC, BMP, and volume status in the perioperative period.

## 2021-11-05 NOTE — PRE-PROCEDURE INSTRUCTIONS
1. You will be called between 3pm-7pm one business day before your procedure to tell you where and when to report. If you do not receive a phone call by 7pm, please call the Admissions office at [phone].    2. Please report to Admissions or Short Procedure Unit on the day of your procedure.   Detailed directions will be given to you when you are called with arrival time.        3. Please follow the following fasting guidelines:     No solid food EIGHT HOURS prior to surgery.  Unlimited clear liquids, meaning water or PLAIN black coffee WITHOUT any milk, cream, sugar, or sweetener are permitted up to TWO HOURS prior to arrival at the hospital.    4. Early on the morning of the procedure please take your usual dose of the listed medications with a sip of water:  Per Dr. Huynh, take NO medications on morning of surgery.  Stop taking your Aleve today.  You may take Tylenol for pain as needed.    5. Other Instructions: You may brush your teeth the morning of the procedure. Rinse and spit, do not swallow.  Bring a list of your medications with dosages.  Use surgical wash as directed.     6. If you develop a cold, cough, fever, rash, or other symptom prior to the day of the procedure, please report it to your physician immediately.    7. If you need to cancel the procedure for any reason, please contact your physician.    8. Make arrangements to have someone drive you home from the procedure. If you have not arranged for transportation home, your surgery may be cancelled.     9. You may not take public transportation unless accompanied by a responsible person.    10. You may not drive a car or operate complex or potentially dangerous machinery for 24 hours following anesthesia and/or sedation.    11.  If it is medically necessary for you to have a longer stay, you will be informed as soon as the decision is made.    12. Only bring essential items to the hospital.  Do not wear or bring anything of value to the  hospital including jewelry of any kind, money, or wallet. Do not wear make-up or contact lenses.  DO NOT BRING MEDICATIONS FROM HOME. DO bring your hearing aids, glasses, and a case    13. No lotion, creams, powders, or oils on skin the morning of procedure     14. Dress in comfortable clothes.    15.  If instructed, please bring a copy of your Advanced Directive (Living Will/Durable Power of ) on the day of your procedure.     16. Patients need to quarantine from the time of PAT COVID test to day of surgery, regardless of COVID vaccine status.      17. Ensuring your safety at all times is a very important part of our [facility] Culture of Safety. After having surgery and sedation, you are at risk for falling and balance issues. Although you may feel awake, the effects of the medication can last up to 24 hours after anesthesia. If you need to use the bathroom during your recovery period, nursing staff will escort you there and stay with you to ensure your safety.    Pre operative instructions given as per protocol.  Form explained by: Yenny Sanabria RN    I have read and understand the above information. I have had sufficient opportunity to ask questions I might have and they have been answered to my satisfaction. I agree to comply with the Patient Responsibilities listed above and have received a copy of this form.

## 2021-11-06 LAB — BACTERIA UR CULT: NORMAL

## 2021-11-10 ENCOUNTER — TRANSCRIBE ORDERS (OUTPATIENT)
Dept: SCHEDULING | Age: 31
End: 2021-11-10
Payer: COMMERCIAL

## 2021-11-10 DIAGNOSIS — Z11.59 ENCOUNTER FOR SCREENING FOR OTHER VIRAL DISEASES: Primary | ICD-10-CM

## 2021-11-15 ENCOUNTER — ANESTHESIA EVENT (OUTPATIENT)
Dept: OPERATING ROOM | Facility: HOSPITAL | Age: 31
Setting detail: SURGERY ADMIT
DRG: 989 | End: 2021-11-15
Payer: COMMERCIAL

## 2021-11-16 ENCOUNTER — APPOINTMENT (OUTPATIENT)
Dept: LAB | Age: 31
End: 2021-11-16
Attending: UROLOGY
Payer: COMMERCIAL

## 2021-11-16 DIAGNOSIS — Z11.59 ENCOUNTER FOR SCREENING FOR OTHER VIRAL DISEASES: ICD-10-CM

## 2021-11-16 PROCEDURE — U0003 INFECTIOUS AGENT DETECTION BY NUCLEIC ACID (DNA OR RNA); SEVERE ACUTE RESPIRATORY SYNDROME CORONAVIRUS 2 (SARS-COV-2) (CORONAVIRUS DISEASE [COVID-19]), AMPLIFIED PROBE TECHNIQUE, MAKING USE OF HIGH THROUGHPUT TECHNOLOGIES AS DESCRIBED BY CMS-2020-01-R: HCPCS

## 2021-11-16 PROCEDURE — C9803 HOPD COVID-19 SPEC COLLECT: HCPCS

## 2021-11-17 LAB — SARS-COV-2 RNA RESP QL NAA+PROBE: NEGATIVE

## 2021-11-18 NOTE — ANESTHESIA PREPROCEDURE EVALUATION
Relevant Problems   No relevant active problems       ROS/Med Hx     Patient Active Problem List   Diagnosis   • Open wound of umbilical region   • Tobacco use   • Obesity     Past Surgical History:   Procedure Laterality Date   • DENTAL SURGERY      3rd molars     Prior to Admission medications    Medication Sig Start Date End Date Taking? Authorizing Provider   naproxen sodium (ALEVE) 220 mg capsule Take 440 mg by mouth as needed.      Provider, Historical, MD     CBC Results       11/05/21     1453    WBC 10.12    RBC 4.70    HGB 14.8    HCT 43.2    MCV 91.9    MCH 31.5    MCHC 34.3         BMP Results       11/05/21 08/19/21     1453 0000     --    K 3.8 --    Cl 102 --    CO2 26 --    Glucose 87 --    BUN 17 18    Creatinine 1.1 0.88    Calcium 9.6 --    Anion Gap 9 --    EGFR >60.0 115      134     Physical Exam    Airway   Mallampati: III   TM distance: >3 FB   Neck ROM: full  Cardiovascular    Rhythm: regular   Rate: normalPulmonary - normal   clear to auscultation  Dental - normal    Anesthesia Plan    Plan: general    Technique: general endotracheal     Lines and Monitors: PIV and additional IV     Airway: direct visual laryngoscopy and oral intubation   ASA 2  Anesthetic plan and risks discussed with: patient  Induction:    intravenous   Postop Plan:   Patient Disposition: inpatient floor planned admission   Pain Management: IV analgesics

## 2021-11-19 ENCOUNTER — HOSPITAL ENCOUNTER (INPATIENT)
Facility: HOSPITAL | Age: 31
LOS: 1 days | Discharge: HOME | DRG: 989 | End: 2021-11-20
Attending: UROLOGY | Admitting: UROLOGY
Payer: COMMERCIAL

## 2021-11-19 ENCOUNTER — APPOINTMENT (OUTPATIENT)
Dept: PULMONOLOGY | Facility: HOSPITAL | Age: 31
End: 2021-11-19
Attending: STUDENT IN AN ORGANIZED HEALTH CARE EDUCATION/TRAINING PROGRAM
Payer: COMMERCIAL

## 2021-11-19 ENCOUNTER — ANESTHESIA (OUTPATIENT)
Dept: OPERATING ROOM | Facility: HOSPITAL | Age: 31
Setting detail: SURGERY ADMIT
DRG: 989 | End: 2021-11-19
Payer: COMMERCIAL

## 2021-11-19 DIAGNOSIS — Q64.4: ICD-10-CM

## 2021-11-19 LAB
ABO + RH BLD: NORMAL
BLD GP AB SCN SERPL QL: NEGATIVE
D AG BLD QL: POSITIVE
LABORATORY COMMENT REPORT: NORMAL
SPECIMEN EXP DATE BLD: NORMAL

## 2021-11-19 PROCEDURE — 86900 BLOOD TYPING SEROLOGIC ABO: CPT

## 2021-11-19 PROCEDURE — 36415 COLL VENOUS BLD VENIPUNCTURE: CPT | Performed by: UROLOGY

## 2021-11-19 PROCEDURE — 36000006 HC OR LEVEL 6 INITIAL 30MIN: Performed by: UROLOGY

## 2021-11-19 PROCEDURE — 12000000 HC ROOM AND CARE MED/SURG

## 2021-11-19 PROCEDURE — 71000001 HC PACU PHASE 1 INITIAL 30MIN: Performed by: UROLOGY

## 2021-11-19 PROCEDURE — 63600000 HC DRUGS/DETAIL CODE: Performed by: NURSE ANESTHETIST, CERTIFIED REGISTERED

## 2021-11-19 PROCEDURE — 71000011 HC PACU PHASE 1 EA ADDL MIN: Performed by: UROLOGY

## 2021-11-19 PROCEDURE — 63600000 HC DRUGS/DETAIL CODE: Performed by: STUDENT IN AN ORGANIZED HEALTH CARE EDUCATION/TRAINING PROGRAM

## 2021-11-19 PROCEDURE — 37000001 HC ANESTHESIA GENERAL: Performed by: UROLOGY

## 2021-11-19 PROCEDURE — 25000000 HC PHARMACY GENERAL: Performed by: NURSE ANESTHETIST, CERTIFIED REGISTERED

## 2021-11-19 PROCEDURE — 63600000 HC DRUGS/DETAIL CODE: Performed by: UROLOGY

## 2021-11-19 PROCEDURE — 88304 TISSUE EXAM BY PATHOLOGIST: CPT | Performed by: UROLOGY

## 2021-11-19 PROCEDURE — 63700000 HC SELF-ADMINISTRABLE DRUG: Performed by: STUDENT IN AN ORGANIZED HEALTH CARE EDUCATION/TRAINING PROGRAM

## 2021-11-19 PROCEDURE — 63600000 HC DRUGS/DETAIL CODE: Performed by: ANESTHESIOLOGY

## 2021-11-19 PROCEDURE — 25000000 HC PHARMACY GENERAL: Performed by: UROLOGY

## 2021-11-19 PROCEDURE — 25800000 HC PHARMACY IV SOLUTIONS: Performed by: STUDENT IN AN ORGANIZED HEALTH CARE EDUCATION/TRAINING PROGRAM

## 2021-11-19 PROCEDURE — 36000016 HC OR LEVEL 6 EA ADDL MIN: Performed by: UROLOGY

## 2021-11-19 PROCEDURE — 25800000 HC PHARMACY IV SOLUTIONS: Performed by: NURSE ANESTHETIST, CERTIFIED REGISTERED

## 2021-11-19 PROCEDURE — 0TJB8ZZ INSPECTION OF BLADDER, VIA NATURAL OR ARTIFICIAL OPENING ENDOSCOPIC: ICD-10-PCS | Performed by: UROLOGY

## 2021-11-19 PROCEDURE — 8E0W4CZ ROBOTIC ASSISTED PROCEDURE OF TRUNK REGION, PERCUTANEOUS ENDOSCOPIC APPROACH: ICD-10-PCS | Performed by: UROLOGY

## 2021-11-19 PROCEDURE — 27200000 HC STERILE SUPPLY: Performed by: UROLOGY

## 2021-11-19 PROCEDURE — 63600000 HC DRUGS/DETAIL CODE: Mod: JW | Performed by: NURSE ANESTHETIST, CERTIFIED REGISTERED

## 2021-11-19 PROCEDURE — 0WBF4ZZ EXCISION OF ABDOMINAL WALL, PERCUTANEOUS ENDOSCOPIC APPROACH: ICD-10-PCS | Performed by: UROLOGY

## 2021-11-19 RX ORDER — DEXTROSE 40 %
15-30 GEL (GRAM) ORAL AS NEEDED
Status: DISCONTINUED | OUTPATIENT
Start: 2021-11-19 | End: 2021-11-20

## 2021-11-19 RX ORDER — IBUPROFEN 200 MG
16-32 TABLET ORAL AS NEEDED
Status: DISCONTINUED | OUTPATIENT
Start: 2021-11-19 | End: 2021-11-20

## 2021-11-19 RX ORDER — DEXTROSE 50 % IN WATER (D50W) INTRAVENOUS SYRINGE
25 AS NEEDED
Status: DISCONTINUED | OUTPATIENT
Start: 2021-11-19 | End: 2021-11-20

## 2021-11-19 RX ORDER — SODIUM CHLORIDE 9 MG/ML
INJECTION, SOLUTION INTRAVENOUS CONTINUOUS PRN
Status: DISCONTINUED | OUTPATIENT
Start: 2021-11-19 | End: 2021-11-19 | Stop reason: SURG

## 2021-11-19 RX ORDER — DIPHENHYDRAMINE HCL 50 MG/ML
12.5 VIAL (ML) INJECTION
Status: DISCONTINUED | OUTPATIENT
Start: 2021-11-19 | End: 2021-11-19 | Stop reason: HOSPADM

## 2021-11-19 RX ORDER — MIDAZOLAM HYDROCHLORIDE 2 MG/2ML
INJECTION, SOLUTION INTRAMUSCULAR; INTRAVENOUS AS NEEDED
Status: DISCONTINUED | OUTPATIENT
Start: 2021-11-19 | End: 2021-11-19 | Stop reason: SURG

## 2021-11-19 RX ORDER — HYDROMORPHONE HYDROCHLORIDE 1 MG/ML
0.5 INJECTION, SOLUTION INTRAMUSCULAR; INTRAVENOUS; SUBCUTANEOUS
Status: DISCONTINUED | OUTPATIENT
Start: 2021-11-19 | End: 2021-11-19 | Stop reason: HOSPADM

## 2021-11-19 RX ORDER — CEFAZOLIN SODIUM 1 G/50ML
1 SOLUTION INTRAVENOUS
Status: COMPLETED | OUTPATIENT
Start: 2021-11-19 | End: 2021-11-20

## 2021-11-19 RX ORDER — SODIUM CHLORIDE 9 MG/ML
INJECTION, SOLUTION INTRAVENOUS CONTINUOUS
Status: DISCONTINUED | OUTPATIENT
Start: 2021-11-19 | End: 2021-11-20

## 2021-11-19 RX ORDER — ONDANSETRON HYDROCHLORIDE 2 MG/ML
4 INJECTION, SOLUTION INTRAVENOUS EVERY 8 HOURS PRN
Status: DISCONTINUED | OUTPATIENT
Start: 2021-11-19 | End: 2021-11-20

## 2021-11-19 RX ORDER — HEPARIN SODIUM 5000 [USP'U]/ML
5000 INJECTION, SOLUTION INTRAVENOUS; SUBCUTANEOUS
Status: COMPLETED | OUTPATIENT
Start: 2021-11-19 | End: 2021-11-19

## 2021-11-19 RX ORDER — OXYCODONE HYDROCHLORIDE 5 MG/1
5 TABLET ORAL EVERY 4 HOURS PRN
Status: DISCONTINUED | OUTPATIENT
Start: 2021-11-19 | End: 2021-11-20

## 2021-11-19 RX ORDER — PROPOFOL 10 MG/ML
INJECTION, EMULSION INTRAVENOUS AS NEEDED
Status: DISCONTINUED | OUTPATIENT
Start: 2021-11-19 | End: 2021-11-19 | Stop reason: SURG

## 2021-11-19 RX ORDER — ONDANSETRON HYDROCHLORIDE 2 MG/ML
4 INJECTION, SOLUTION INTRAVENOUS
Status: DISCONTINUED | OUTPATIENT
Start: 2021-11-19 | End: 2021-11-19 | Stop reason: HOSPADM

## 2021-11-19 RX ORDER — POLYETHYLENE GLYCOL 3350 17 G/17G
17 POWDER, FOR SOLUTION ORAL DAILY PRN
Status: DISCONTINUED | OUTPATIENT
Start: 2021-11-19 | End: 2021-11-20

## 2021-11-19 RX ORDER — SODIUM CHLORIDE, SODIUM LACTATE, POTASSIUM CHLORIDE, CALCIUM CHLORIDE 600; 310; 30; 20 MG/100ML; MG/100ML; MG/100ML; MG/100ML
INJECTION, SOLUTION INTRAVENOUS CONTINUOUS
Status: DISCONTINUED | OUTPATIENT
Start: 2021-11-19 | End: 2021-11-19 | Stop reason: HOSPADM

## 2021-11-19 RX ORDER — ONDANSETRON HYDROCHLORIDE 2 MG/ML
INJECTION, SOLUTION INTRAVENOUS AS NEEDED
Status: DISCONTINUED | OUTPATIENT
Start: 2021-11-19 | End: 2021-11-19 | Stop reason: SURG

## 2021-11-19 RX ORDER — FENTANYL CITRATE 50 UG/ML
INJECTION, SOLUTION INTRAMUSCULAR; INTRAVENOUS AS NEEDED
Status: DISCONTINUED | OUTPATIENT
Start: 2021-11-19 | End: 2021-11-19 | Stop reason: SURG

## 2021-11-19 RX ORDER — MINERAL OIL
180 ENEMA (ML) RECTAL DAILY PRN
COMMUNITY

## 2021-11-19 RX ORDER — DEXAMETHASONE SODIUM PHOSPHATE 4 MG/ML
INJECTION, SOLUTION INTRA-ARTICULAR; INTRALESIONAL; INTRAMUSCULAR; INTRAVENOUS; SOFT TISSUE AS NEEDED
Status: DISCONTINUED | OUTPATIENT
Start: 2021-11-19 | End: 2021-11-19 | Stop reason: SURG

## 2021-11-19 RX ORDER — SODIUM CHLORIDE, SODIUM LACTATE, POTASSIUM CHLORIDE, CALCIUM CHLORIDE 600; 310; 30; 20 MG/100ML; MG/100ML; MG/100ML; MG/100ML
INJECTION, SOLUTION INTRAVENOUS CONTINUOUS
Status: ACTIVE | OUTPATIENT
Start: 2021-11-19 | End: 2021-11-19

## 2021-11-19 RX ORDER — ROCURONIUM BROMIDE 10 MG/ML
INJECTION, SOLUTION INTRAVENOUS AS NEEDED
Status: DISCONTINUED | OUTPATIENT
Start: 2021-11-19 | End: 2021-11-19 | Stop reason: SURG

## 2021-11-19 RX ORDER — BUPIVACAINE HYDROCHLORIDE 5 MG/ML
INJECTION, SOLUTION PERINEURAL
Status: DISCONTINUED | OUTPATIENT
Start: 2021-11-19 | End: 2021-11-19 | Stop reason: HOSPADM

## 2021-11-19 RX ORDER — OXYCODONE HYDROCHLORIDE 5 MG/1
5 TABLET ORAL EVERY 4 HOURS PRN
Status: DISCONTINUED | OUTPATIENT
Start: 2021-11-19 | End: 2021-11-19

## 2021-11-19 RX ORDER — KETOROLAC TROMETHAMINE 30 MG/ML
INJECTION, SOLUTION INTRAMUSCULAR; INTRAVENOUS AS NEEDED
Status: DISCONTINUED | OUTPATIENT
Start: 2021-11-19 | End: 2021-11-19 | Stop reason: SURG

## 2021-11-19 RX ORDER — TALC
3 POWDER (GRAM) TOPICAL NIGHTLY
COMMUNITY

## 2021-11-19 RX ORDER — ACETAMINOPHEN 325 MG/1
650 TABLET ORAL EVERY 4 HOURS PRN
Status: DISCONTINUED | OUTPATIENT
Start: 2021-11-19 | End: 2021-11-20

## 2021-11-19 RX ORDER — LIDOCAINE HCL/PF 100 MG/5ML
SYRINGE (ML) INTRAVENOUS AS NEEDED
Status: DISCONTINUED | OUTPATIENT
Start: 2021-11-19 | End: 2021-11-19 | Stop reason: SURG

## 2021-11-19 RX ORDER — CALCIUM CARBONATE 200(500)MG
1 TABLET,CHEWABLE ORAL DAILY PRN
COMMUNITY

## 2021-11-19 RX ADMIN — FENTANYL CITRATE 25 MCG: 50 INJECTION, SOLUTION INTRAMUSCULAR; INTRAVENOUS at 14:15

## 2021-11-19 RX ADMIN — PROPOFOL INJECTABLE EMULSION 400 MG: 10 INJECTION, EMULSION INTRAVENOUS at 12:08

## 2021-11-19 RX ADMIN — ROCURONIUM BROMIDE 40 MG: 10 INJECTION, SOLUTION INTRAVENOUS at 12:22

## 2021-11-19 RX ADMIN — LIDOCAINE HYDROCHLORIDE 5 ML: 20 INJECTION, SOLUTION INTRAVENOUS at 12:08

## 2021-11-19 RX ADMIN — FENTANYL CITRATE 50 MCG: 50 INJECTION, SOLUTION INTRAMUSCULAR; INTRAVENOUS at 12:42

## 2021-11-19 RX ADMIN — FENTANYL CITRATE 25 MCG: 50 INJECTION, SOLUTION INTRAMUSCULAR; INTRAVENOUS at 14:03

## 2021-11-19 RX ADMIN — ROCURONIUM BROMIDE 50 MG: 10 INJECTION, SOLUTION INTRAVENOUS at 12:08

## 2021-11-19 RX ADMIN — DEXAMETHASONE SODIUM PHOSPHATE 8 MG: 4 INJECTION, SOLUTION INTRA-ARTICULAR; INTRALESIONAL; INTRAMUSCULAR; INTRAVENOUS; SOFT TISSUE at 12:19

## 2021-11-19 RX ADMIN — OXYCODONE HYDROCHLORIDE 5 MG: 5 TABLET ORAL at 21:22

## 2021-11-19 RX ADMIN — HYDROMORPHONE HYDROCHLORIDE 0.5 MG: 1 INJECTION, SOLUTION INTRAMUSCULAR; INTRAVENOUS; SUBCUTANEOUS at 15:05

## 2021-11-19 RX ADMIN — FENTANYL CITRATE 25 MCG: 50 INJECTION, SOLUTION INTRAMUSCULAR; INTRAVENOUS at 14:12

## 2021-11-19 RX ADMIN — ROCURONIUM BROMIDE 10 MG: 10 INJECTION, SOLUTION INTRAVENOUS at 12:42

## 2021-11-19 RX ADMIN — CEFAZOLIN SODIUM 1 G: 1 SOLUTION INTRAVENOUS at 19:23

## 2021-11-19 RX ADMIN — HYDROMORPHONE HYDROCHLORIDE 0.5 MG: 1 INJECTION, SOLUTION INTRAMUSCULAR; INTRAVENOUS; SUBCUTANEOUS at 17:00

## 2021-11-19 RX ADMIN — ONDANSETRON HYDROCHLORIDE 4 MG: 2 INJECTION, SOLUTION INTRAMUSCULAR; INTRAVENOUS at 13:08

## 2021-11-19 RX ADMIN — FENTANYL CITRATE 100 MCG: 50 INJECTION, SOLUTION INTRAMUSCULAR; INTRAVENOUS at 12:08

## 2021-11-19 RX ADMIN — OXYCODONE HYDROCHLORIDE 5 MG: 5 TABLET ORAL at 17:54

## 2021-11-19 RX ADMIN — SODIUM CHLORIDE, POTASSIUM CHLORIDE, SODIUM LACTATE AND CALCIUM CHLORIDE: 600; 310; 30; 20 INJECTION, SOLUTION INTRAVENOUS at 10:20

## 2021-11-19 RX ADMIN — SUGAMMADEX 500 MG: 100 INJECTION, SOLUTION INTRAVENOUS at 13:53

## 2021-11-19 RX ADMIN — SODIUM CHLORIDE: 9 INJECTION, SOLUTION INTRAVENOUS at 21:15

## 2021-11-19 RX ADMIN — HYDROMORPHONE HYDROCHLORIDE 0.5 MG: 1 INJECTION, SOLUTION INTRAMUSCULAR; INTRAVENOUS; SUBCUTANEOUS at 15:46

## 2021-11-19 RX ADMIN — ROCURONIUM BROMIDE 10 MG: 10 INJECTION, SOLUTION INTRAVENOUS at 12:53

## 2021-11-19 RX ADMIN — FENTANYL CITRATE 100 MCG: 50 INJECTION, SOLUTION INTRAMUSCULAR; INTRAVENOUS at 12:24

## 2021-11-19 RX ADMIN — FENTANYL CITRATE 25 MCG: 50 INJECTION, SOLUTION INTRAMUSCULAR; INTRAVENOUS at 14:08

## 2021-11-19 RX ADMIN — KETOROLAC TROMETHAMINE 15 MG: 30 INJECTION, SOLUTION INTRAMUSCULAR at 13:56

## 2021-11-19 RX ADMIN — ROCURONIUM BROMIDE 10 MG: 10 INJECTION, SOLUTION INTRAVENOUS at 13:24

## 2021-11-19 RX ADMIN — MIDAZOLAM HYDROCHLORIDE 2 MG: 1 INJECTION, SOLUTION INTRAMUSCULAR; INTRAVENOUS at 11:53

## 2021-11-19 RX ADMIN — CEFTRIAXONE SODIUM 1 G: 1 INJECTION, POWDER, FOR SOLUTION INTRAVENOUS at 12:06

## 2021-11-19 RX ADMIN — HYDROMORPHONE HYDROCHLORIDE 0.5 MG: 1 INJECTION, SOLUTION INTRAMUSCULAR; INTRAVENOUS; SUBCUTANEOUS at 14:45

## 2021-11-19 RX ADMIN — SODIUM CHLORIDE: 9 INJECTION, SOLUTION INTRAVENOUS at 11:54

## 2021-11-19 RX ADMIN — HEPARIN SODIUM 5000 UNITS: 5000 INJECTION INTRAVENOUS; SUBCUTANEOUS at 11:32

## 2021-11-19 ASSESSMENT — PATIENT HEALTH QUESTIONNAIRE - PHQ9: SUM OF ALL RESPONSES TO PHQ9 QUESTIONS 1 & 2: 0

## 2021-11-19 NOTE — INTERVAL H&P NOTE
H&P reviewed. The patient was examined and there are no changes to the H&P.    Jermaine River DO, MARA, FACOS  Urologic Oncology and Complex General Urology  [facility]  [phone]

## 2021-11-19 NOTE — OR SURGEON
Pre-Procedure patient identification:  I am the primary operating surgeon/proceduralist and I have identified the patient on 11/19/21 at 11:25 AM Jermaine River DO  Phone Number: [phone]

## 2021-11-19 NOTE — ANESTHESIA PROCEDURE NOTES
Airway  Urgency: elective    Start Time: 11/19/2021 12:11 PM    General Information and Staff    Patient location during procedure: OR  Anesthesiologist: Ivana Bradford MD  Resident/CRNA: Elina Caballero CRNA  Performed: resident/CRNA     Indications and Patient Condition  Indications for airway management: anesthesia  Sedation level: deep  Preoxygenated: yes  Patient position: sniffing  Mask difficulty assessment: 1 - vent by mask    Final Airway Details  Final airway type: endotracheal airway      Successful airway: ETT    Successful intubation technique: direct laryngoscopy  Facilitating devices/methods: intubating stylet  Endotracheal tube insertion site: oral  Blade: Karon  Blade size: #4  ETT size (mm): 8.0  Cormack-Lehane Classification: grade IIa - partial view of glottis  Placement verified by: chest auscultation and capnometry   Measured from: lips  ETT to lips (cm): 23  Number of attempts at approach: 1  Ventilation between attempts: none  Number of other approaches attempted: 0  Atraumatic airway insertion

## 2021-11-19 NOTE — BRIEF OP NOTE
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   NO          Does this Patient have Sickle Cell Disease/Sickle Cell Anemia?   NO          Release to patient   Immediate        PATHOLOGY TISSUE EXAM Urinary Bladder Pre-op diagnosis:  anomalies of urachus Collected By: Jermaine River DO 11/19/2021  1:43 PM     Release to patient   Immediate              Drains:   [REMOVED] Urethral Catheter Latex 20 Fr (Removed)       Implants: * No implants in log *           Complications:  None; patient tolerated the procedure well.           Disposition: PACU - hemodynamically stable.           Condition: stable    Jermaine River DO  Phone Number: [phone]

## 2021-11-19 NOTE — ANESTHESIOLOGIST PRE-PROCEDURE ATTESTATION
ED Nurse Note:

Patient reports decreased abdominal pain after vomiting. No facial grimacing or 
guarding noted. Patient in on phone. Pre-Procedure Patient Identification:  I am the Primary Anesthesiologist and have identified the patient on 11/19/21 at 11:40 AM.   I have confirmed the procedure(s) will be performed by the following surgeon/proceduralist Jermaine River DO.

## 2021-11-19 NOTE — OP NOTE
Robotic assisted laparoscopic urachal resection,partial cystectomy, cystoscopy Procedure Note     Procedure:    Robotic assisted laparoscopic urachal resection,partial cystectomy, cystoscopy    Indications: The patient is a 31-year-old male with a history of recurrent drainage from his umbilicus.  CT scan demonstrated a thickened urachal remnant.  The patient was advised undergo robotic urachal remnant resection possible partial cystectomy and cystoscopy.  All risk benefits alternative treatment cystoscopy the patient and his family wish to proceed with surgery.  Informed consent was obtained.       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  He was given appropriate prophylactic antibiotics.  SCDs were placed.  Anesthesia was induced without complication.  He was prepped and draped in the standard sterile lithotomy position.  All pressure points were padded.  Timeout was performed and agreed upon myself and to operative team.  An OG tube had been placed by anesthesia.  A flexible cystoscope was assembled and inserted the patient's urethra and passed proximally through the prostate into the bladder.  A pan cystourethroscopy was performed.  No masses tumors or lesions were present side urethra or the bladder.  Bilateral ureteral orifices were present in the native orthotopic location with clear effluxing urine.  There was no evidence cystoscopically that the urachal remnant was into the bladder mucosa.  At this point the scope was removed under direct visualization, and a Adler catheter was placed with 10 cc of sterile water into the balloon.    4 to 5 cm above the umbilicus a transverse incision was made and  dissection was carried down to the fascia.  2, 0 Vicryl UR 6 holding sutures were placed into the fascia.  A Veress needle was placed through the abdominal cavity and into the peritoneum.  After successful drop test, the abdomen was insufflated to 15 mmHg.  Once this was completed, the Veress needle was removed and an 8 mm robotic port was placed into the abdominal cavity.  Using a 30 degree scope the abdomen was inspected.  No damage was caused by the placement of the Veress needle.  2 other 8 mm robotic ports were placed in the standard fashion triangulating the urachal remnant.  I entered the space of Retzius by drop in the bladder off the anterior abdominal wall as I moved proximally I encountered the thickened urachal remnant appearing to insert into the perivesical tissues.  This was dissected free and dissected up to the level of the umbilicus.  I dissected the urachal remnant off of the dome of the bladder.  It it did not appear to enter the bladder mucosa however but terminated on the perivesicle tissue.  I dissected the urachal remnant up to the level of the umbilicus, and removed in its entirety.  I was careful not to penetrate the umbilical skin.  Once this entire lesion was removed I placed it into an Endo Catch bag.  At this point I tested the bladder with 450 cc of sterile saline.  No leak was present.  I ensured we had adequate hemostasis.  I then undocked the robot and had removed all instruments.  I lengthen the incision of my supraumbilical camera port in order to accommodate the size of the specimen which was removed within the Endo Catch bag.  This sent pathology as urachal remnant.  I then closed the fascia using interrupted figure-of-eight 0 Vicryl CT 2 sutures.  All skin was closed with 3-0 Vicryl for the deep dermal layer and 4-0 Monocryl.  Dermabond was applied.  His Adler catheter was then removed.  The patient was then awoken from anesthesia without complication.  He was transferred to  the postoperative recovery unit.  He will be admitted to the hospital for 23-hour observation for postoperative monitoring.  His diet can be advanced as tolerated.  We will follow-up his a.m. labs.    Findings:  See above    Estimated Blood Loss:  15 mL           Drains: None           Total IV Fluids: Per anesthesia records            Specimens:   ID Type Source Tests Collected by Time Destination   1 : Urachal Remnant Tissue Urinary Bladder PATHOLOGY TISSUE EXAM Jermaine River DO 11/19/2021 1342               Implants: * No implants in log *           Complications:  none           Disposition: PACU - hemodynamically stable.           Condition: stable    Jermaine River DO  Phone Number: [phone]

## 2021-11-20 VITALS
SYSTOLIC BLOOD PRESSURE: 136 MMHG | DIASTOLIC BLOOD PRESSURE: 63 MMHG | WEIGHT: 309.1 LBS | HEART RATE: 89 BPM | OXYGEN SATURATION: 97 % | RESPIRATION RATE: 14 BRPM | TEMPERATURE: 97.6 F | BODY MASS INDEX: 36.5 KG/M2 | HEIGHT: 77 IN

## 2021-11-20 PROBLEM — Q64.4: Status: ACTIVE | Noted: 2021-11-20

## 2021-11-20 LAB
ANION GAP SERPL CALC-SCNC: 11 MEQ/L (ref 3–15)
BUN SERPL-MCNC: 16 MG/DL (ref 8–20)
CALCIUM SERPL-MCNC: 9.1 MG/DL (ref 8.9–10.3)
CHLORIDE SERPL-SCNC: 100 MEQ/L (ref 98–109)
CO2 SERPL-SCNC: 24 MEQ/L (ref 22–32)
CREAT SERPL-MCNC: 0.7 MG/DL (ref 0.8–1.3)
ERYTHROCYTE [DISTWIDTH] IN BLOOD BY AUTOMATED COUNT: 12 % (ref 11.6–14.4)
GFR SERPL CREATININE-BSD FRML MDRD: >60 ML/MIN/1.73M*2
GLUCOSE SERPL-MCNC: 106 MG/DL (ref 70–99)
HCT VFR BLDCO AUTO: 42.7 % (ref 40.1–51)
HGB BLD-MCNC: 14.8 G/DL (ref 13.7–17.5)
MCH RBC QN AUTO: 31.6 PG (ref 28–33.2)
MCHC RBC AUTO-ENTMCNC: 34.7 G/DL (ref 32.2–36.5)
MCV RBC AUTO: 91 FL (ref 83–98)
PDW BLD AUTO: 12.4 FL (ref 9.4–12.4)
PLATELET # BLD AUTO: 300 K/UL (ref 150–350)
POTASSIUM SERPL-SCNC: 4.4 MEQ/L (ref 3.6–5.1)
RBC # BLD AUTO: 4.69 M/UL (ref 4.5–5.8)
SODIUM SERPL-SCNC: 135 MEQ/L (ref 136–144)
WBC # BLD AUTO: 15.61 K/UL (ref 3.8–10.5)

## 2021-11-20 PROCEDURE — 12000000 HC ROOM AND CARE MED/SURG

## 2021-11-20 PROCEDURE — 63700000 HC SELF-ADMINISTRABLE DRUG: Performed by: PHYSICIAN ASSISTANT

## 2021-11-20 PROCEDURE — 63700000 HC SELF-ADMINISTRABLE DRUG: Performed by: STUDENT IN AN ORGANIZED HEALTH CARE EDUCATION/TRAINING PROGRAM

## 2021-11-20 PROCEDURE — 36415 COLL VENOUS BLD VENIPUNCTURE: CPT | Performed by: STUDENT IN AN ORGANIZED HEALTH CARE EDUCATION/TRAINING PROGRAM

## 2021-11-20 PROCEDURE — 25800000 HC PHARMACY IV SOLUTIONS: Performed by: STUDENT IN AN ORGANIZED HEALTH CARE EDUCATION/TRAINING PROGRAM

## 2021-11-20 PROCEDURE — 80048 BASIC METABOLIC PNL TOTAL CA: CPT | Performed by: STUDENT IN AN ORGANIZED HEALTH CARE EDUCATION/TRAINING PROGRAM

## 2021-11-20 PROCEDURE — 85027 COMPLETE CBC AUTOMATED: CPT | Performed by: STUDENT IN AN ORGANIZED HEALTH CARE EDUCATION/TRAINING PROGRAM

## 2021-11-20 PROCEDURE — 63600000 HC DRUGS/DETAIL CODE: Performed by: STUDENT IN AN ORGANIZED HEALTH CARE EDUCATION/TRAINING PROGRAM

## 2021-11-20 RX ORDER — DOCUSATE SODIUM 100 MG/1
100 CAPSULE, LIQUID FILLED ORAL 2 TIMES DAILY
Qty: 60 CAPSULE | Refills: 0 | Status: SHIPPED | OUTPATIENT
Start: 2021-11-20 | End: 2021-12-20

## 2021-11-20 RX ORDER — OXYCODONE HYDROCHLORIDE 5 MG/1
10 TABLET ORAL EVERY 6 HOURS PRN
Status: DISCONTINUED | OUTPATIENT
Start: 2021-11-20 | End: 2021-11-20

## 2021-11-20 RX ADMIN — CEFAZOLIN SODIUM 1 G: 1 SOLUTION INTRAVENOUS at 04:45

## 2021-11-20 RX ADMIN — OXYCODONE HYDROCHLORIDE 5 MG: 5 TABLET ORAL at 09:06

## 2021-11-20 RX ADMIN — OXYCODONE HYDROCHLORIDE 5 MG: 5 TABLET ORAL at 01:20

## 2021-11-20 RX ADMIN — OXYCODONE HYDROCHLORIDE 5 MG: 5 TABLET ORAL at 10:46

## 2021-11-20 RX ADMIN — SODIUM CHLORIDE: 9 INJECTION, SOLUTION INTRAVENOUS at 05:19

## 2021-11-20 RX ADMIN — OXYCODONE HYDROCHLORIDE 5 MG: 5 TABLET ORAL at 05:19

## 2021-11-20 NOTE — PROGRESS NOTES
Daily Progress Note    Subjective   POD # 1 Robotic assisted laparoscopic urachal resection,partial cystectomy, cystoscopy    Denies CP, SOB, leg/calf pain, n/v/f/c.  Tolerated clears, not OOB yet.  Requested Oxycodone increase from 5mg to 10mg.  + flauts, no BM  Reports severe dysuria last evening but improving with time. No GH.      Objective     Vital signs in last 24 hours:  Temp:  [36.4 °C (97.6 °F)-36.9 °C (98.4 °F)] 36.4 °C (97.6 °F)  Heart Rate:  [59-98] 89  Resp:  [7-19] 14  BP: (127-156)/(61-85) 136/63      Intake/Output Summary (Last 24 hours) at 11/20/2021 0919  Last data filed at 11/20/2021 0519  Gross per 24 hour   Intake 1625 ml   Output 1685 ml   Net -60 ml     Intake/Output this shift:  No intake/output data recorded.    Labs  CBC Results       11/20/21 11/05/21     0442 1453    WBC 15.61 10.12    RBC 4.69 4.70    HGB 14.8 14.8    HCT 42.7 43.2    MCV 91.0 91.9    MCH 31.6 31.5    MCHC 34.7 34.3     271        BMP Results       11/20/21 11/05/21 08/19/21     0442 1453 0000     137 --    K 4.4 3.8 --    Cl 100 102 --    CO2 24 26 --    Glucose 106 87 --    BUN 16 17 18    Creatinine 0.7 1.1 0.88    Calcium 9.1 9.6 --    Anion Gap 11 9 --    EGFR >60.0 >60.0 115       134            Physical Exam:  Gen: WDWN  Psych: AAO x 3, NAD  HEENT: NC/AT  Pulm: nonlabored breathing  Abd:ND, moderate inc tenderness, inc sites without drainage or erythema  Gu: voiding  Ext: no c/t/e  Neuro: CNS intact     A/P: 31-year-old male with a history of recurrent drainage from his umbilicus.  CT scan demonstrated a thickened urachal remnant.  POD # 1 Robotic assisted laparoscopic urachal resection,partial cystectomy, cystoscopy    - Advance diet, ambulate  - D/C home later today. Rx Oxycodone 10mg, Colace  - F/U Dr. River 2-3 weeks    Radha Robles PA-C  [facility]  [phone]

## 2021-11-20 NOTE — DISCHARGE SUMMARY
Inpatient Discharge Summary    BRIEF OVERVIEW  Admitting Provider: Jermaine River DO  Discharge Provider: Jermaine River DO  Primary Care Physician at Discharge: Enrrique Campos [phone]     Admission Date: 11/19/2021     Discharge Date: 11/20/2021    Primary Discharge Diagnosis  No Principal Problem: There is no principal problem currently on the Problem List. Please update the Problem List and refresh.    Secondary Discharge Diagnosis      Discharge Disposition     Code Status at Discharge: Full Code    Discharge Medications     Medication List      START taking these medications    docusate sodium 100 mg capsule  Commonly known as: COLACE  Take 1 capsule (100 mg total) by mouth 2 (two) times a day.  Dose: 100 mg        CONTINUE taking these medications    calcium carbonate 200 mg calcium (500 mg) chewable tablet  Commonly known as: TUMS  Take 1 tablet by mouth daily as needed for indigestion or heartburn.  Dose: 1 tablet     EXCEDRIN EXTRA STRENGTH ORAL  Take 2 tablets by mouth daily as needed.  Dose: 2 tablet     fexofenadine 180 mg tablet  Commonly known as: ALLEGRA  Take 180 mg by mouth daily as needed.  Dose: 180 mg     melatonin 3 mg tablet  Take 3 mg by mouth nightly.  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

## 2021-11-20 NOTE — NURSING NOTE
11-7: VSS. Tolerating clears. Surgical pain controlled with Oxy 5 mg q4, but patient continues to have mod-severe burning with urination. Reassurance provided that Dr was notified. Urine is clear yellow. IVF infusing as per orders. Seqs on. Safety maintained.

## 2021-11-20 NOTE — NURSING NOTE
Pt arrived to the unit approx.1800 today via bed x 2 transport staff with all of his belongings from PACU without incident; pt noted with wife at the bedside assisting with care; Dr. Denton, Urologist, made aware of pt voiding with slight discomfort via initial urination; pain management noted in place; IV fluids infusing without any problems noted at this time; safety precautions noted in place; call bell within reach; will continue to monitor for any changes.

## 2021-11-20 NOTE — PROGRESS NOTES
Urology Brief Note    Called and spoke with nursing about patient, states that other than some burning with urination he is doing well. His pain is controlled with current pain medication regimen, he has voided multiple times since his catheter was removed and he is tolerating his clear liquid diet.     Plan  - advance diet in AM  - D/c IVF in AM  - Plan for discharge home tomorrow if continues to progress well  - Follow up with Dr. River after discharge     Bertin Simon, PGY-4

## 2021-11-20 NOTE — NURSING NOTE
Pt d/c home. ivs removed. Pt has paper scripts for oxycodone and colace. D/c completed and all questions answered. Pt wheeled to lobby to meet wife. Pt took his clothes, duffell bag, phone and .

## 2021-11-20 NOTE — DISCHARGE INSTRUCTIONS
No lifting x 1 week  No driving while taking pain medication  Call office Monday to schedule follow up appt for 2-3 weeks with Dr River  Call office with any questions or concerns [phone]

## 2021-11-22 NOTE — ANESTHESIA POSTPROCEDURE EVALUATION
Patient: Esa Luna    Procedure Summary     Date: 11/19/21 Room / Location:  OR 1 / [facility] OR    Anesthesia Start: 1154 Anesthesia Stop: 1423    Procedure: Robotic assisted laparoscopic urachal resection,partial cystectomy, cystoscopy (N/A Abdomen) Diagnosis:       Anomalies of urachus, congenital      (anomalies of urachus)    Surgeons: Jermaine River DO Responsible Provider: Dallin Sanches MD    Anesthesia Type: general ASA Status: 2          Anesthesia Type: general  PACU Vitals  11/19/2021 1418 - 11/19/2021 1518      11/19/2021  1430 11/19/2021  1445 11/19/2021  1500 11/19/2021  1515    BP: 156/85 152/61 136/77 139/68    Temp: -- -- -- --    Pulse: 74 83 67 65    Resp: 13 19 10 9    SpO2: 97 % 99 % 98 % 98 %              11/19/2021  1517             BP: 139/68       Temp: 36.8 °C (98.2 °F)       Pulse: 64       Resp: 11       SpO2: --               Anesthesia Post Evaluation    Pain management: adequate  Mode of pain management: IV medication  Patient location during evaluation: PACU  Patient participation: complete - patient participated  Level of consciousness: awake and alert  Cardiovascular status: acceptable  Airway Patency: adequate  Respiratory status: acceptable  Hydration status: acceptable  Anesthetic complications: no

## 2021-11-24 LAB
CASE RPRT: NORMAL
CLINICAL INFO: NORMAL
PATH REPORT.FINAL DX SPEC: NORMAL
PATH REPORT.GROSS SPEC: NORMAL

## 2024-01-01 ENCOUNTER — HOSPITAL ENCOUNTER (EMERGENCY)
Facility: HOSPITAL | Age: 34
Discharge: HOME | End: 2024-01-01
Attending: EMERGENCY MEDICINE
Payer: COMMERCIAL

## 2024-01-01 VITALS
RESPIRATION RATE: 18 BRPM | DIASTOLIC BLOOD PRESSURE: 65 MMHG | HEIGHT: 77 IN | SYSTOLIC BLOOD PRESSURE: 124 MMHG | WEIGHT: 290 LBS | BODY MASS INDEX: 34.24 KG/M2 | TEMPERATURE: 97.6 F | OXYGEN SATURATION: 95 % | HEART RATE: 80 BPM

## 2024-01-01 DIAGNOSIS — R04.0 NOSEBLEED: Primary | ICD-10-CM

## 2024-01-01 DIAGNOSIS — J06.9 URI (UPPER RESPIRATORY INFECTION): ICD-10-CM

## 2024-01-01 PROCEDURE — 99282 EMERGENCY DEPT VISIT SF MDM: CPT

## 2024-01-01 PROCEDURE — 99283 EMERGENCY DEPT VISIT LOW MDM: CPT | Performed by: EMERGENCY MEDICINE

## 2024-01-01 RX ORDER — OXYMETAZOLINE HYDROCHLORIDE 0.05 G/100ML
2 SPRAY NASAL ONCE
Status: COMPLETED | OUTPATIENT
Start: 2024-01-01 | End: 2024-01-01

## 2024-01-01 RX ADMIN — OXYMETAZOLINE HYDROCHLORIDE 2 SPRAY: 0.05 SPRAY NASAL at 13:18

## 2024-01-01 NOTE — ED PROVIDER NOTES
History  Chief Complaint   Patient presents with    Nose Bleed     Pt reports nosebleed earlier, pt feels as though he's getting sick. Nosebleed has stopped     33-year-old male presents emergency room complaining of epistaxis which occurred however has resolved.  The patient notes that he has been staying in a air B&B with electric heating and stated it was very dry.  The patient also was having some sinus congestion on the left.  The patient notes that he was driving back to [place] and started getting blood from the left nare.  He says it was heavy bleeding but it only lasted a little while.  Patient notes that he feels he swallowed a lot of blood and feels a little nauseated from that.  The patient denies any chest pain or abdominal pain.  The patient states he has never really had a nosebleed before so they came to the hospital.  The patient currently has no bleeding at present.  Patient also complains of mild cough and congestion.        None       History reviewed. No pertinent past medical history.    History reviewed. No pertinent surgical history.    History reviewed. No pertinent family history.  I have reviewed and agree with the history as documented.    E-Cigarette/Vaping    E-Cigarette Use Current Every Day User      E-Cigarette/Vaping Substances    Nicotine No     THC No     CBD No     Flavoring No     Other No     Unknown No      Social History     Tobacco Use    Smoking status: Never    Smokeless tobacco: Never   Vaping Use    Vaping status: Every Day   Substance Use Topics    Alcohol use: Not Currently    Drug use: Not Currently       Review of Systems   Constitutional:  Positive for activity change. Negative for chills and fever.   HENT:  Positive for congestion and nosebleeds. Negative for ear pain and sore throat.    Eyes:  Negative for pain and visual disturbance.   Respiratory:  Negative for cough and shortness of breath.    Cardiovascular:  Negative for chest pain and palpitations.    Gastrointestinal:  Negative for abdominal pain and vomiting.   Genitourinary:  Negative for dysuria and hematuria.   Musculoskeletal:  Negative for arthralgias and back pain.   Skin:  Negative for color change and rash.   Neurological:  Negative for seizures and syncope.   All other systems reviewed and are negative.      Physical Exam  Physical Exam  Constitutional:       General: He is not in acute distress.     Appearance: Normal appearance. He is normal weight. He is not ill-appearing.   HENT:      Head: Normocephalic and atraumatic.      Right Ear: External ear normal.      Left Ear: External ear normal.      Nose: Nose normal. No congestion.      Comments: Patient with area of bleeding noted on the medial aspect of the left nasal septum.  There is no active bleeding at this time.     Mouth/Throat:      Mouth: Mucous membranes are moist.   Eyes:      Conjunctiva/sclera: Conjunctivae normal.   Cardiovascular:      Rate and Rhythm: Normal rate and regular rhythm.      Pulses: Normal pulses.      Heart sounds: Normal heart sounds.   Pulmonary:      Effort: Pulmonary effort is normal.      Breath sounds: Normal breath sounds.   Abdominal:      General: Abdomen is flat. There is no distension.      Palpations: Abdomen is soft. There is no mass.      Tenderness: There is no abdominal tenderness.   Musculoskeletal:         General: No swelling, tenderness or deformity. Normal range of motion.      Cervical back: Normal range of motion.   Skin:     General: Skin is warm and dry.      Capillary Refill: Capillary refill takes 2 to 3 seconds.      Coloration: Skin is not pale.   Neurological:      General: No focal deficit present.      Mental Status: He is alert and oriented to person, place, and time. Mental status is at baseline.   Psychiatric:         Mood and Affect: Mood normal.         Vital Signs  ED Triage Vitals   Temperature Pulse Respirations Blood Pressure SpO2   01/01/24 1237 01/01/24 1237 01/01/24 1237  01/01/24 1238 01/01/24 1237   97.6 °F (36.4 °C) 80 18 124/65 95 %      Temp src Heart Rate Source Patient Position - Orthostatic VS BP Location FiO2 (%)   -- 01/01/24 1237 01/01/24 1237 01/01/24 1237 --    Monitor Lying Left arm       Pain Score       01/01/24 1237       No Pain           Vitals:    01/01/24 1237 01/01/24 1238   BP:  124/65   Pulse: 80    Patient Position - Orthostatic VS: Lying          Visual Acuity      ED Medications  Medications   oxymetazoline (AFRIN) 0.05 % nasal spray 2 spray (has no administration in time range)       Diagnostic Studies  Results Reviewed       None                   No orders to display              Procedures  Procedures         ED Course                               SBIRT 20yo+      Flowsheet Row Most Recent Value   Initial Alcohol Screen: US AUDIT-C     1. How often do you have a drink containing alcohol? 0 Filed at: 01/01/2024 1240   2. How many drinks containing alcohol do you have on a typical day you are drinking?  0 Filed at: 01/01/2024 1240   3a. Male UNDER 65: How often do you have five or more drinks on one occasion? 0 Filed at: 01/01/2024 1240   Audit-C Score 0 Filed at: 01/01/2024 1240   MARY BETH: How many times in the past year have you...    Used an illegal drug or used a prescription medication for non-medical reasons? Never Filed at: 01/01/2024 1240                      Medical Decision Making  33-year-old male presents emergency room after sustaining a left sided nosebleed which occurred while driving back to [place].  Patient notes that he is suffering from a URI with cough and congestion as well as left sinus pressure.  Patient had a small amount of blood from his nare in the morning and then had bleeding start while they were in the car driving back.  The patient notes that bleeding has stopped.  And is here for evaluation of nosebleeding which occurred roughly an hour prior to arrival.  Patients differential diagnosis is nasal trauma, nasal mucosa  "dehydration, or posterior nosebleed.  Patient had his nose examined by myself which showed an area of likely bleeding on the medial nasal mucosa anteriorly.  Patient has some mild nausea but feels that is likely from swallowing blood which he notes he did a \"lot of.\"  Patient will be given oxymetazoline for both sinus pressure as well as to reduce the risk of rebleeding.  Patient will also be given a nasal clamp to utilize if bleeding occurs.  Patient discharged.             Disposition  Final diagnoses:   Nosebleed   URI (upper respiratory infection)     Time reflects when diagnosis was documented in both MDM as applicable and the Disposition within this note       Time User Action Codes Description Comment    1/1/2024  1:13 PM Wiliam James [R04.0] Nosebleed     1/1/2024  1:13 PM Wiliam James [J06.9] URI (upper respiratory infection)           ED Disposition       ED Disposition   Discharge    Condition   Stable    Date/Time   Mon Jan 1, 2024 1314    Comment   Shawn Rodriguez discharge to home/self care.                   Follow-up Information    None         Patient's Medications    No medications on file       No discharge procedures on file.    PDMP Review       None            ED Provider  Electronically Signed by             Wiliam James Jr.,   01/01/24 1317    "

## 2024-01-01 NOTE — DISCHARGE INSTRUCTIONS
Return to the ER with any new, concerning, or worsening issues.    I would buy Royal nasal saline gel, and use it in your nostrils twice a day.    Use Afrin as indicated on the package labeling for the next 2 to 3 days.    If bleeding reoccurs, use nasal clamp that was given to you and hold for 20 minutes.    Check with your family doctor in 2 to 4 days for repeat evaluation.

## (undated) DEVICE — ADHESIVE SKIN DERMABOND ADVANCED 0.7ML

## (undated) DEVICE — PACK UNIVERSAL

## (undated) DEVICE — GOWN SURG STRL W/TWL XLG

## (undated) DEVICE — NEEDLE DISP HYPO 18GX1-1/2IN

## (undated) DEVICE — ***USE 56982*** SUTURE SILK 2-0  K833H

## (undated) DEVICE — APPLICATOR ARISTA FLEXITIP XL 38CM

## (undated) DEVICE — NEEDLE INSUFFLATION 120MM

## (undated) DEVICE — GAUZE 4X4 16 PLY RFID DOUBLE XRAY

## (undated) DEVICE — SUTURE VLOC 2-0 90 VIOLET 1X12 GS-21

## (undated) DEVICE — TRAY URINE METER FOLEY NON-SILVER

## (undated) DEVICE — SKIN MARKER SURGICAL

## (undated) DEVICE — TROCAR 1ST ENTRY 12 X 100MM ADV Z-THREAD

## (undated) DEVICE — SYRINGE DISP LUER-LOK 10 CC

## (undated) DEVICE — BLADE PROTECTED SIZE 10

## (undated) DEVICE — SOLN IRRIG STER WATER 1000ML

## (undated) DEVICE — APPLICATOR CHLORAPREP 26ML ORANGE TINT

## (undated) DEVICE — PAD POSITIONING XL W/ARM PROTECTORS

## (undated) DEVICE — COVER MAYO STAND

## (undated) DEVICE — HEMOSTAT SURGICEL ABSORBABLE 4 X 8

## (undated) DEVICE — DRAPE ARM DAVINCI XI

## (undated) DEVICE — STRYKEFLOW 2 W/ DISP TIP

## (undated) DEVICE — DRAPE HALF STERILE

## (undated) DEVICE — ***USE 143206***SYRINGE BULB

## (undated) DEVICE — ***USE 56901*** SUTURE PDS II 4-0 Z304H RB-1 27IN VIOLET

## (undated) DEVICE — SUTURE VICRYL 0 J270H CT-2 27IN UNDYED

## (undated) DEVICE — PENEVAC1 NONSTICK SMOKE EVAC

## (undated) DEVICE — SYSTEM VISUALIZATION CLEARIFY

## (undated) DEVICE — CLIPS HEMOLOK MLX

## (undated) DEVICE — COVER LIGHTHANDLE

## (undated) DEVICE — DRAPE ARM 4 EXTENSION DAVINCI X

## (undated) DEVICE — SPONGE LAP 18X18 SAFE-T RFID ENHANCED XRAY

## (undated) DEVICE — EVACUATOR CLOSED SUCTION 100C

## (undated) DEVICE — EVACUATOR PLUME-AWAY LAP SMOKE PKG

## (undated) DEVICE — OINTMENT SURGILUBE 4OZ TUBE

## (undated) DEVICE — SOLN IRRIG .9%SOD 3L

## (undated) DEVICE — SHEARS TIP COVER DAVINCI ONE USE

## (undated) DEVICE — SUTURE VLOC 3-0 90 VIOLET 1X9 CV-23

## (undated) DEVICE — SUTURE VICRYL 2-0 J417H SH UNDYED

## (undated) DEVICE — GLOVE PROTEXIS PI ORTHO 8.0

## (undated) DEVICE — ***USE 56941*** SUTURE VICRYL 0 J603H UR-6

## (undated) DEVICE — MANIFOLD FOUR PORT NEPTUNE

## (undated) DEVICE — TROCAR 1ST ENTRY 5 X 100MM ADV Z-THREAD

## (undated) DEVICE — SPONGE LAP 4X18 SAFE-T RFID ENHANCED XRAY

## (undated) DEVICE — SUTURE VLOC 3-0 90 VIOLET 1X6 CV-23

## (undated) DEVICE — STAPLER SKIN 35W PROXIMATE PLUS

## (undated) DEVICE — PAD GROUND ELECTROSURGICAL W/CORD

## (undated) DEVICE — SOLUTION ELECTROLUBE ANTI-STICK

## (undated) DEVICE — ***USE 138152*** ENDOPOUCH RETRIEVE 10MM

## (undated) DEVICE — DRAIN FLAT 7MM

## (undated) DEVICE — PARTICLES HEMOSTATIC ARISTA 1 GRAM

## (undated) DEVICE — LOOPS VESSEL MAXI BLUE DISPOSABLE

## (undated) DEVICE — STATLOCK FOLEY CATHETER

## (undated) DEVICE — TUBING INSUFFLATION PNEUMOSURE HEATED HIGH FLOW

## (undated) DEVICE — SEAL UNIVERSAL 5MM-8MM XI